# Patient Record
Sex: MALE | Race: ASIAN | NOT HISPANIC OR LATINO
[De-identification: names, ages, dates, MRNs, and addresses within clinical notes are randomized per-mention and may not be internally consistent; named-entity substitution may affect disease eponyms.]

---

## 2023-02-18 ENCOUNTER — APPOINTMENT (OUTPATIENT)
Dept: ORTHOPEDIC SURGERY | Facility: CLINIC | Age: 18
End: 2023-02-18
Payer: COMMERCIAL

## 2023-02-18 VITALS — BODY MASS INDEX: 28 KG/M2 | WEIGHT: 200 LBS | HEIGHT: 71 IN

## 2023-02-18 DIAGNOSIS — Z78.9 OTHER SPECIFIED HEALTH STATUS: ICD-10-CM

## 2023-02-18 DIAGNOSIS — S93.491A SPRAIN OF OTHER LIGAMENT OF RIGHT ANKLE, INITIAL ENCOUNTER: ICD-10-CM

## 2023-02-18 PROBLEM — Z00.00 ENCOUNTER FOR PREVENTIVE HEALTH EXAMINATION: Status: ACTIVE | Noted: 2023-02-18

## 2023-02-18 PROCEDURE — 73610 X-RAY EXAM OF ANKLE: CPT | Mod: RT

## 2023-02-18 PROCEDURE — 99204 OFFICE O/P NEW MOD 45 MIN: CPT

## 2023-02-18 RX ORDER — NAPROXEN 500 MG/1
500 TABLET ORAL
Qty: 30 | Refills: 0 | Status: ACTIVE | COMMUNITY
Start: 2023-02-18 | End: 1900-01-01

## 2023-02-18 NOTE — IMAGING
[de-identified] : The patient is a well appearing 17 year old male of their stated age.  \par Patient ambulates with a normal gait.  \par Negative straight leg raise.  \par \par Effected Foot and Ankle:  \par RIGHT \par \par Inspection:  \par Erythema: None  \par Ecchymosis: None  \par Abrasions: None  \par Effusion: Mild\par Deformity: None  \par Pes Fontana Valgus: Negative  \par Pes Cavus: Negative  \par \par Palpation:  \par Crepitus: None  \par Medial Maleolus: Nontender  \par Lateral Maleolus: Nontender  \par Syndesmosis: Nontender  \par Proximal Fibula: Nontender  \par ATFL: Tender  \par CFL: Nontender  \par Deltoid: Mildly tender  \par Calcaneus: Nontender  \par Talar Head/Neck: Nontender  \par Peroneal Tendons: Nontender \par Posterior Tibialis: Nontender  \par Achilles Tendon: Nontender & Intact  \par Anterior Capsule: Nontender  \par Hindfoot: Nontender  \par Midfoot: Nontender  \par Forefoot: Nontender  \par \par ROM:  \par Ankle Dorsiflexion: 30 degrees  \par Ankle Plantar Flexion: 45 degrees  \par \par Motor:  \par Dorsiflexion: 5 out of 5  \par Plantar Flexion: 5 out of 5  \par Inversion: 5  out of 5  \par Eversion: 5 out of 5  \par EHL: 5 out of 5  \par FHL: 5 out of 5  \par \par Provocative Testing:  \par Anterior Drawer: Negative  \par Syndesmosis Squeeze Test: Negative  \par Kauffman's Test: Negative  \par Midfoot Stability/Rotation: Negative  \par Heel Raise Inversion: Normal  \par Triple Hop: Normal  \par \par Neurologic Exam:  \par L4-S1 Sensation: Grossly Intact  \par Vascular Exam/Pulses:  \par Dorsalis Pedis: 2+  \par Posterior Tibialis: 2+  \par Capillary Refill: <2 Seconds  \par Other Exams: None  \par \par Pertinent Contralateral Findings: None  \par \par  \par Assessment: The patient is a 17 year old male with right ankle pain and radiographic and physical exam findings consistent with right ankle sprain.   \par \par The patient’s condition is acute \par Documents/Results Reviewed Today:  None\par Tests/Studies Independently Interpreted Today: XR right ankle ordered and interpreted in office today reveals no evidence of fracture or dislocation. \par Pertinent findings include:  +ATFL tenderness, unable to squat jump or triple hop without pain \par Confounding medical conditions/concerns: None \par \par Plan: Long pneumatic boot fit and dispensed in office today. Patient will attend physical therapy. Follow up during school break in 3 weeks. \par Tests Ordered:  None \par Prescription Medications Ordered: Naproxen 500mg BID \par Braces/DME Ordered: Long pneumatic CAM boot\par Activity/Work/Sports Status: Out of gym and sports until follow up. \par Additional Instructions: None \par Follow-Up: Follow up Dr. Denny 3/16/23\par \par The patient's current medication management of their orthopedic diagnosis was reviewed today:\par \par (1) We discussed a comprehensive treatment plan that included possible pharmaceutical management involving the use of prescription strength medications including but not limited to options such as oral Naprosyn 500mg BID, once daily Meloxicam 15 mg, or 500-650 mg Tylenol versus over the counter oral medications and topical prescription NSAID Pennsaid vs over the counter Voltaren gel. Based on our extensive discussion, the patient was prescribed Naprosyn 500mg BID for two weeks. It will then be used PRN for pain, inflammation and discomfort.\par \par (2) There is a moderate risk of morbidity with further treatment, especially from use of prescription strength medications and possible side effects of these medications which include upset stomach with oral medications, skin reactions to topical medications and cardiac/renal issues with long term use.\par \par (3) I recommended that the patient follow-up with their medical physician to discuss any significant specific potential issues with long term medication use such as interactions with current medications or with exacerbation of underlying medical comorbidities.\par \par (4) The benefits and risks associated with use of injectable, oral or topical, prescription and over the counter anti-inflammatory medications were discussed with the patient. The patient voiced understanding of the risks including but not limited to bleeding, stroke, kidney dysfunction, heart disease, and were referred to the black box warning label for further information.\par \par The documentation accurately reflects the service IShamika PA-C, personally performed and the decisions made by me.\par \par  [Right] : right ankle [Outside films reviewed] : Outside films reviewed

## 2023-02-18 NOTE — HISTORY OF PRESENT ILLNESS
[Right Leg] : right leg [Sports related] : sports related [Sudden] : sudden [4] : 4 [3] : 3 [Dull/Aching] : dull/aching [Throbbing] : throbbing [Intermittent] : intermittent [Leisure] : leisure [Rest] : rest [Ice] : ice [Walking] : walking [Stairs] : stairs [] : Patient is currently playing sports: no [FreeTextEntry3] : 1/24/2023 [FreeTextEntry5] : STANDING , WENT TO [place]IVOT AND FELT A "POP" [de-identified] : XRAYS NOT PRESENT  [de-identified] : [facility]  [de-identified] : [facility]

## 2023-03-16 ENCOUNTER — APPOINTMENT (OUTPATIENT)
Dept: ORTHOPEDIC SURGERY | Facility: CLINIC | Age: 18
End: 2023-03-16